# Patient Record
Sex: FEMALE | Race: WHITE
[De-identification: names, ages, dates, MRNs, and addresses within clinical notes are randomized per-mention and may not be internally consistent; named-entity substitution may affect disease eponyms.]

---

## 2013-09-24 VITALS — SYSTOLIC BLOOD PRESSURE: 108 MMHG | DIASTOLIC BLOOD PRESSURE: 44 MMHG

## 2019-02-25 ENCOUNTER — HOSPITAL ENCOUNTER (EMERGENCY)
Dept: HOSPITAL 33 - ED | Age: 29
Discharge: HOME | End: 2019-02-25
Payer: COMMERCIAL

## 2019-02-25 VITALS — DIASTOLIC BLOOD PRESSURE: 76 MMHG | SYSTOLIC BLOOD PRESSURE: 106 MMHG

## 2019-02-25 VITALS — OXYGEN SATURATION: 99 % | HEART RATE: 89 BPM

## 2019-02-25 DIAGNOSIS — S80.12XA: Primary | ICD-10-CM

## 2019-02-25 DIAGNOSIS — S40.011A: ICD-10-CM
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
## 2019-02-25 DIAGNOSIS — F15.10: ICD-10-CM

## 2019-02-25 DIAGNOSIS — S80.11XA: ICD-10-CM

## 2019-02-25 DIAGNOSIS — S70.12XA: ICD-10-CM

## 2019-02-25 DIAGNOSIS — R07.81: ICD-10-CM

## 2019-02-25 DIAGNOSIS — S70.11XA: ICD-10-CM

## 2019-02-25 DIAGNOSIS — S00.83XA: ICD-10-CM

## 2019-02-25 DIAGNOSIS — R58: ICD-10-CM
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
## 2019-02-25 PROCEDURE — 99283 EMERGENCY DEPT VISIT LOW MDM: CPT

## 2019-02-25 PROCEDURE — 71045 X-RAY EXAM CHEST 1 VIEW: CPT

## 2019-02-25 PROCEDURE — 71100 X-RAY EXAM RIBS UNI 2 VIEWS: CPT
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
